# Patient Record
Sex: FEMALE | Race: WHITE | NOT HISPANIC OR LATINO | Employment: STUDENT | URBAN - METROPOLITAN AREA
[De-identification: names, ages, dates, MRNs, and addresses within clinical notes are randomized per-mention and may not be internally consistent; named-entity substitution may affect disease eponyms.]

---
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
## 2025-01-13 ENCOUNTER — APPOINTMENT (EMERGENCY)
Dept: CT IMAGING | Facility: HOSPITAL | Age: 20
End: 2025-01-13
Payer: COMMERCIAL

## 2025-01-13 ENCOUNTER — HOSPITAL ENCOUNTER (EMERGENCY)
Facility: HOSPITAL | Age: 20
Discharge: HOME | End: 2025-01-14
Attending: EMERGENCY MEDICINE | Admitting: EMERGENCY MEDICINE
Payer: COMMERCIAL

## 2025-01-13 DIAGNOSIS — R53.83 FATIGUE: ICD-10-CM

## 2025-01-13 DIAGNOSIS — R51.9 HEADACHE: Primary | ICD-10-CM

## 2025-01-13 DIAGNOSIS — R04.0 EPISTAXIS: ICD-10-CM

## 2025-01-13 DIAGNOSIS — I10 HYPERTENSION: ICD-10-CM

## 2025-01-13 LAB
ALBUMIN SERPL BCG-MCNC: 3.8 G/DL (ref 3.5–5)
ALP SERPL-CCNC: 82 U/L (ref 34–104)
ALT SERPL W P-5'-P-CCNC: 17 U/L (ref 7–52)
ANION GAP SERPL CALCULATED.3IONS-SCNC: 8 MMOL/L (ref 4–13)
AST SERPL W P-5'-P-CCNC: 15 U/L (ref 13–39)
BASOPHILS # BLD AUTO: 0.05 THOUSANDS/ΜL (ref 0–0.1)
BASOPHILS NFR BLD AUTO: 1 % (ref 0–1)
BILIRUB SERPL-MCNC: 0.26 MG/DL (ref 0.2–1)
BUN SERPL-MCNC: 12 MG/DL (ref 5–25)
CALCIUM SERPL-MCNC: 9 MG/DL (ref 8.4–10.2)
CARDIAC TROPONIN I PNL SERPL HS: <2 NG/L (ref ?–50)
CHLORIDE SERPL-SCNC: 104 MMOL/L (ref 96–108)
CO2 SERPL-SCNC: 25 MMOL/L (ref 21–32)
CREAT SERPL-MCNC: 0.64 MG/DL (ref 0.6–1.3)
EOSINOPHIL # BLD AUTO: 0.18 THOUSAND/ΜL (ref 0–0.61)
EOSINOPHIL NFR BLD AUTO: 2 % (ref 0–6)
ERYTHROCYTE [DISTWIDTH] IN BLOOD BY AUTOMATED COUNT: 13.5 % (ref 11.6–15.1)
GFR SERPL CREATININE-BSD FRML MDRD: 129 ML/MIN/1.73SQ M
GLUCOSE SERPL-MCNC: 81 MG/DL (ref 65–140)
HCG SERPL QL: NEGATIVE
HCT VFR BLD AUTO: 42.3 % (ref 34.8–46.1)
HGB BLD-MCNC: 13 G/DL (ref 11.5–15.4)
IMM GRANULOCYTES # BLD AUTO: 0.03 THOUSAND/UL (ref 0–0.2)
IMM GRANULOCYTES NFR BLD AUTO: 0 % (ref 0–2)
LYMPHOCYTES # BLD AUTO: 3.34 THOUSANDS/ΜL (ref 0.6–4.47)
LYMPHOCYTES NFR BLD AUTO: 38 % (ref 14–44)
MCH RBC QN AUTO: 24.9 PG (ref 26.8–34.3)
MCHC RBC AUTO-ENTMCNC: 30.7 G/DL (ref 31.4–37.4)
MCV RBC AUTO: 81 FL (ref 82–98)
MONOCYTES # BLD AUTO: 0.62 THOUSAND/ΜL (ref 0.17–1.22)
MONOCYTES NFR BLD AUTO: 7 % (ref 4–12)
NEUTROPHILS # BLD AUTO: 4.59 THOUSANDS/ΜL (ref 1.85–7.62)
NEUTS SEG NFR BLD AUTO: 52 % (ref 43–75)
NRBC BLD AUTO-RTO: 0 /100 WBCS
PLATELET # BLD AUTO: 310 THOUSANDS/UL (ref 149–390)
PMV BLD AUTO: 8.9 FL (ref 8.9–12.7)
POTASSIUM SERPL-SCNC: 3.9 MMOL/L (ref 3.5–5.3)
PROT SERPL-MCNC: 7.2 G/DL (ref 6.4–8.4)
RBC # BLD AUTO: 5.22 MILLION/UL (ref 3.81–5.12)
SODIUM SERPL-SCNC: 137 MMOL/L (ref 135–147)
TSH SERPL DL<=0.05 MIU/L-ACNC: 4.15 UIU/ML (ref 0.45–4.5)
WBC # BLD AUTO: 8.81 THOUSAND/UL (ref 4.31–10.16)

## 2025-01-13 PROCEDURE — 70498 CT ANGIOGRAPHY NECK: CPT

## 2025-01-13 PROCEDURE — 93005 ELECTROCARDIOGRAM TRACING: CPT

## 2025-01-13 PROCEDURE — 96361 HYDRATE IV INFUSION ADD-ON: CPT

## 2025-01-13 PROCEDURE — 96375 TX/PRO/DX INJ NEW DRUG ADDON: CPT

## 2025-01-13 PROCEDURE — 70496 CT ANGIOGRAPHY HEAD: CPT

## 2025-01-13 PROCEDURE — 84443 ASSAY THYROID STIM HORMONE: CPT | Performed by: EMERGENCY MEDICINE

## 2025-01-13 PROCEDURE — 80053 COMPREHEN METABOLIC PANEL: CPT | Performed by: EMERGENCY MEDICINE

## 2025-01-13 PROCEDURE — 99285 EMERGENCY DEPT VISIT HI MDM: CPT

## 2025-01-13 PROCEDURE — 36415 COLL VENOUS BLD VENIPUNCTURE: CPT | Performed by: EMERGENCY MEDICINE

## 2025-01-13 PROCEDURE — 99284 EMERGENCY DEPT VISIT MOD MDM: CPT | Performed by: EMERGENCY MEDICINE

## 2025-01-13 PROCEDURE — 85025 COMPLETE CBC W/AUTO DIFF WBC: CPT | Performed by: EMERGENCY MEDICINE

## 2025-01-13 PROCEDURE — 84484 ASSAY OF TROPONIN QUANT: CPT | Performed by: EMERGENCY MEDICINE

## 2025-01-13 PROCEDURE — 84703 CHORIONIC GONADOTROPIN ASSAY: CPT | Performed by: EMERGENCY MEDICINE

## 2025-01-13 PROCEDURE — 96365 THER/PROPH/DIAG IV INF INIT: CPT

## 2025-01-13 RX ORDER — MAGNESIUM SULFATE 1 G/100ML
1 INJECTION INTRAVENOUS ONCE
Status: COMPLETED | OUTPATIENT
Start: 2025-01-13 | End: 2025-01-14

## 2025-01-13 RX ORDER — METOCLOPRAMIDE HYDROCHLORIDE 5 MG/ML
10 INJECTION INTRAMUSCULAR; INTRAVENOUS ONCE
Status: COMPLETED | OUTPATIENT
Start: 2025-01-13 | End: 2025-01-13

## 2025-01-13 RX ORDER — DIPHENHYDRAMINE HYDROCHLORIDE 50 MG/ML
25 INJECTION INTRAMUSCULAR; INTRAVENOUS ONCE
Status: COMPLETED | OUTPATIENT
Start: 2025-01-13 | End: 2025-01-13

## 2025-01-13 RX ADMIN — DIPHENHYDRAMINE HYDROCHLORIDE 25 MG: 50 INJECTION, SOLUTION INTRAMUSCULAR; INTRAVENOUS at 22:16

## 2025-01-13 RX ADMIN — IOHEXOL 85 ML: 350 INJECTION, SOLUTION INTRAVENOUS at 22:58

## 2025-01-13 RX ADMIN — MAGNESIUM SULFATE HEPTAHYDRATE 1 G: 1 INJECTION, SOLUTION INTRAVENOUS at 23:29

## 2025-01-13 RX ADMIN — METOCLOPRAMIDE 10 MG: 5 INJECTION, SOLUTION INTRAMUSCULAR; INTRAVENOUS at 22:16

## 2025-01-13 RX ADMIN — SODIUM CHLORIDE 1000 ML: 0.9 INJECTION, SOLUTION INTRAVENOUS at 22:16

## 2025-01-13 NOTE — Clinical Note
Peace Conway accompanied Astrid Sanabria to the emergency department on 1/13/2025.    Return date if applicable: 01/14/2025        If you have any questions or concerns, please don't hesitate to call.      Daiana Wheeler RN

## 2025-01-14 VITALS
TEMPERATURE: 98.1 F | OXYGEN SATURATION: 99 % | HEART RATE: 89 BPM | DIASTOLIC BLOOD PRESSURE: 57 MMHG | SYSTOLIC BLOOD PRESSURE: 127 MMHG | RESPIRATION RATE: 18 BRPM | WEIGHT: 293 LBS | BODY MASS INDEX: 54.77 KG/M2

## 2025-01-14 LAB
ATRIAL RATE: 117 BPM
P AXIS: 55 DEGREES
PR INTERVAL: 154 MS
QRS AXIS: 1 DEGREES
QRSD INTERVAL: 86 MS
QT INTERVAL: 322 MS
QTC INTERVAL: 449 MS
T WAVE AXIS: 38 DEGREES
VENTRICULAR RATE: 117 BPM

## 2025-01-14 PROCEDURE — 93010 ELECTROCARDIOGRAM REPORT: CPT | Performed by: INTERNAL MEDICINE

## 2025-01-14 NOTE — ED PROVIDER NOTES
"Time reflects when diagnosis was documented in both MDM as applicable and the Disposition within this note       Time User Action Codes Description Comment    1/14/2025 12:29 AM HerreraVijay [R51.9] Headache     1/14/2025 12:29 AM Vijay Silverman [R04.0] Epistaxis     1/14/2025 12:29 AM Vijay Silverman [R53.83] Fatigue     1/14/2025 12:31 AM Vijay Silverman [I10] Hypertension           ED Disposition       ED Disposition   Discharge    Condition   Stable    Date/Time   Tue Jan 14, 2025 12:30 AM    Comment   Astrid Sanabria discharge to home/self care.                   Assessment & Plan       Medical Decision Making  19 y.o. female presents with multiple complaints stating \"a lot of things.\"    Patient states over the last 5 days, she has had hypertension and epistaxis though she does not have any epistaxis at present.  She notes intermittent episodes.    Patient denies any chest pain.  Patient denies any dyspnea.    Patient affirms 5 days of bioccipital headaches that have been migratory and intermittent though currently present for the past few hours..  Patient describes \"pressure\" that \"comes and goes but they mostly stay.\"  Note that the triage note states the patient has blurry vision but she states she has \"light sensitivity\" and denies any visual disturbances.  Patient has spectacles and feels her vision is at baseline.    Patient also notes fatigue during this period.  Patient also describes dizziness that she states is \"I just feel woozy, I just feel out of it.\"  Patient states \"sometimes I feel like I'm going to pass out and sometimes I feel really tired.\"  Patient denies any ambulatory difficulty.    Patient also states that she feels as though she has been \"sweating a lot.\"  Patient denies night exclusively.    Patient denies a history of similar headaches.      Patient denies any concerns for CO exposure.  Patient denies any recent tick bites.  Patient denies any recent cervical " manipulation  Patient denies any recent trauma.    Patient denies any history of connective tissue disorders.    Patient denies any history or family history of SAH.    Patient denies any history of immunocompromised state.    Patient denies any use of illicit drugs.  Patient affirms use of estrogen containing products.    Patient affirms nausea but denies vomiting.    Patient denies any fever or chills.  Patient is afebrile in the emergency room.  Patient denies any visual changes other than the light sensitivity.  Patient denies any sensory changes.    Patient denies any focal weakness.      ROS: patient denies seizure, weight loss, confusion, diaphoresis, neck pain/stiffness, facial pain, speech difficulty, gait disturbance, vertigo, lightheadedness, jaw claudication, syncope.  All other review of systems reviewed and noted to be negative.    Focused Objective:  Eyes:  PERRL, EOMI.  Grossly normal fundoscopic exam without signs of papilledema or retinal hemorrhage.  No nystagmus with gaze fixation.  HENT: Atraumatic.  Pharynx moist and non-erythematous.  No tenderness or swelling over the temporal arteries.  Neck: no carotid bruit, no tenderness to palpitation.  Able to touch chin to chest.  Negative jolt accentuation testing.  Neuro:  Alert and answers questions appropriately.  No dysarthria.  Normal tandem gait, including toe walking and heel walking.  Normal Romberg exam.  No pronator drift.  Normal finger to nose.  Normal fine motor function with rapid finger movements.  Normal hand tap.  Cranial nerves II through XII grossly intact.  Visual fields grossly intact. Upper and lower motor strength 5/5 and symmetric.  Normal light touch sensory exam.   Normal DTRs.     Medical Decision Making  Patient presenting with multiple symptoms representing a broad differential.     Patient affirms hypertension and is hypertensive upon arrival.  Patient has established primary care for which I have discussed the need for  follow-up to reevaluate this.  Patient denies any chest pain though she did note palpitations to nursing, she only describes the possible lightheadedness to myself however considering this history will obtain cardiac evaluation and place patient on cardiac monitoring while further evaluating patient's complaints.  I have emphasized the need for follow-up with primary care to reassess hypertension.    Regarding patient's epistaxis, none at present so no indications for intervention.  Discussed symptomatic management if this were to recur and follow-up with ENT for evaluation if symptoms continue but do not worsen.    Regarding patient's headache, unclear vague symptoms but fortunately with intact neurologic exam.  Patient denies any fever and is afebrile upon arrival.  Patient does not have any meningeal signs that would be concerning for infectious etiology.  Considering her history including use of OTCs will obtain CTA imaging to evaluate for potential intracranial etiology including central venous thrombosis or hemorrhage.  Patient's timing is well outside the window for any immediate intervention and she is atypical history without deficits that would be concerning for CVA.  Patient is at risk for intracranial idiopathic hypertension but no clear findings on funduscopic exam on my evaluation.   Will treat patient symptomatically while completing this and reassess the need for additional testing.      Burning patient's fatigue and other symptoms that are quite vague in nature, no clear etiology could be consistent in identifying all of her symptoms.  Will send TSH though I discussed this will be unavailable if initial testing is abnormal.  Obtain electrolytes and CBC to evaluate for anemia.    Will monitor patient, reassess, and reevaluate.    Amount and/or Complexity of Data Reviewed  Labs: ordered.  Radiology: ordered.    Risk  Prescription drug management.        ED Course as of 01/14/25 0405   Tue Jan 14, 2025  "  0033 Patient states her symptoms have resolved following treatment.  Patient states \"I feel good.\"  Patient's laboratory evaluation reassuring and CT imaging without acute findings.  I discussed diagnostic concern regarding etiology and continued evaluation.  Discussed risk and benefits of lumbar puncture and alternative workup and patient prefers follow-up with ophthalmology and declines lumbar puncture in the emergency room.  Discussed alternatively could follow-up with primary care to obtain MRI though currently she is asymptomatic.  Discussed and emphasized the need for this follow-up in detail.  Discussed return to the emergency room any progression or worsening of symptoms.           Medications   metoclopramide (REGLAN) injection 10 mg (10 mg Intravenous Given 1/13/25 2216)   magnesium sulfate IVPB (premix) SOLN 1 g (0 g Intravenous Stopped 1/14/25 0022)   diphenhydrAMINE (BENADRYL) injection 25 mg (25 mg Intravenous Given 1/13/25 2216)   sodium chloride 0.9 % bolus 1,000 mL (0 mL Intravenous Stopped 1/13/25 2300)   iohexol (OMNIPAQUE) 350 MG/ML injection (MULTI-DOSE) 85 mL (85 mL Intravenous Given 1/13/25 2258)       ED Risk Strat Scores            CRAFFT      Flowsheet Row Most Recent Value   CRAFFT Initial Screen: During the past 12 months, did you:    1. Drink any alcohol (more than a few sips)?  No Filed at: 01/14/2025 0118   2. Smoke any marijuana or hashish No Filed at: 01/14/2025 0118   3. Use anything else to get high? (\"anything else\" includes illegal drugs, over the counter and prescription drugs, and things that you sniff or 'santana')? No Filed at: 01/14/2025 0118                                          History of Present Illness       Chief Complaint   Patient presents with    Headache     Pt arrives w/ multiple complaints frequent HA x 5days. Pt reports associated vision blurriness and palpitations. Pt directed to be seen in ED by  provider       Past Medical History:   Diagnosis Date    ADD " (attention deficit disorder)     Anxiety     Depression     Obesity       History reviewed. No pertinent surgical history.   Family History   Problem Relation Age of Onset    No Known Problems Mother     Hypertension Father     Hyperlipidemia Father     Hypertension Maternal Grandmother     Hyperlipidemia Maternal Grandmother     Multiple sclerosis Maternal Grandmother       Social History     Tobacco Use    Smoking status: Former     Types: Cigarettes     Start date: 10/18/2023     Quit date: 10/18/2022     Years since quittin.2     Passive exposure: Current    Smokeless tobacco: Never   Vaping Use    Vaping status: Every Day    Substances: Flavoring   Substance Use Topics    Alcohol use: Never    Drug use: Not Currently     Types: Marijuana     Comment: quit       E-Cigarette/Vaping    E-Cigarette Use Current Every Day User     Comments vaper  NO NICOTINE       E-Cigarette/Vaping Substances    Nicotine No     THC No     CBD No     Flavoring Yes     Other No     Unknown No       I have reviewed and agree with the history as documented.     HPI    Review of Systems        Objective       ED Triage Vitals   Temperature Pulse Blood Pressure Respirations SpO2 Patient Position - Orthostatic VS   25 --   (!) 97.4 °F (36.3 °C) (!) 127 (!) 185/127 21 99 %       Temp Source Heart Rate Source BP Location FiO2 (%) Pain Score    252 25 -- 25    Oral Monitor Left arm  6      Vitals      Date and Time Temp Pulse SpO2 Resp BP Pain Score FACES Pain Rating User   25 98.1 °F (36.7 °C) 89 99 % 18 127/57 -- -- ML   25 0003 -- 115 99 % -- 137/68 -- -- KM   25 -- 111 99 % -- 132/74 -- -- KM   25 -- -- -- -- -- 6 -- AIDAN   25 97.4 °F (36.3 °C) 127 99 % 21 185/127 -- --             Physical Exam  Constitutional:       Appearance: Normal appearance. She is obese.   Eyes:       General: No visual field deficit.  Cardiovascular:      Rate and Rhythm: Regular rhythm. Tachycardia present.   Pulmonary:      Effort: Pulmonary effort is normal.      Breath sounds: Normal breath sounds.   Abdominal:      Palpations: Abdomen is soft.      Tenderness: There is no abdominal tenderness. There is no guarding or rebound.   Neurological:      General: No focal deficit present.      Mental Status: She is alert and oriented to person, place, and time.      Cranial Nerves: No cranial nerve deficit, dysarthria or facial asymmetry.      Sensory: Sensation is intact. No sensory deficit.      Motor: Motor function is intact. No weakness.      Coordination: Coordination is intact. Romberg sign negative. Coordination normal. Finger-Nose-Finger Test normal.      Gait: Gait and tandem walk normal.      Deep Tendon Reflexes: Reflexes normal.      Comments: Identifies common objects appropriately.  Normal hand tap.         Results Reviewed       Procedure Component Value Units Date/Time    TSH, 3rd generation with Free T4 reflex [[id number]]  (Normal) Collected: 01/13/25 2214    Lab Status: Final result Specimen: Blood from Arm, Right Updated: 01/13/25 2254     TSH 3RD GENERATON 4.153 uIU/mL     hCG, qualitative pregnancy [[id number]]  (Normal) Collected: 01/13/25 2214    Lab Status: Final result Specimen: Blood from Arm, Right Updated: 01/13/25 2254     Preg, Serum Negative    CBC and differential [[id number]]  (Abnormal) Collected: 01/13/25 2214    Lab Status: Final result Specimen: Blood from Arm, Right Updated: 01/13/25 2246     WBC 8.81 Thousand/uL      RBC 5.22 Million/uL      Hemoglobin 13.0 g/dL      Hematocrit 42.3 %      MCV 81 fL      MCH 24.9 pg      MCHC 30.7 g/dL      RDW 13.5 %      MPV 8.9 fL      Platelets 310 Thousands/uL      nRBC 0 /100 WBCs      Segmented % 52 %      Immature Grans % 0 %      Lymphocytes % 38 %      Monocytes % 7 %      Eosinophils Relative 2 %      Basophils Relative 1 %       Absolute Neutrophils 4.59 Thousands/µL      Absolute Immature Grans 0.03 Thousand/uL      Absolute Lymphocytes 3.34 Thousands/µL      Absolute Monocytes 0.62 Thousand/µL      Eosinophils Absolute 0.18 Thousand/µL      Basophils Absolute 0.05 Thousands/µL     HS Troponin 0hr (reflex protocol) [[id number]]  (Normal) Collected: 01/13/25 2214    Lab Status: Final result Specimen: Blood from Arm, Right Updated: 01/13/25 2245     hs TnI 0hr <2 ng/L     Comprehensive metabolic panel [[id number]] Collected: 01/13/25 2214    Lab Status: Final result Specimen: Blood from Arm, Right Updated: 01/13/25 2244     Sodium 137 mmol/L      Potassium 3.9 mmol/L      Chloride 104 mmol/L      CO2 25 mmol/L      ANION GAP 8 mmol/L      BUN 12 mg/dL      Creatinine 0.64 mg/dL      Glucose 81 mg/dL      Calcium 9.0 mg/dL      AST 15 U/L      ALT 17 U/L      Alkaline Phosphatase 82 U/L      Total Protein 7.2 g/dL      Albumin 3.8 g/dL      Total Bilirubin 0.26 mg/dL      eGFR 129 ml/min/1.73sq m     Narrative:      National Kidney Disease Foundation guidelines for Chronic Kidney Disease (CKD):     Stage 1 with normal or high GFR (GFR > 90 mL/min/1.73 square meters)    Stage 2 Mild CKD (GFR = 60-89 mL/min/1.73 square meters)    Stage 3A Moderate CKD (GFR = 45-59 mL/min/1.73 square meters)    Stage 3B Moderate CKD (GFR = 30-44 mL/min/1.73 square meters)    Stage 4 Severe CKD (GFR = 15-29 mL/min/1.73 square meters)    Stage 5 End Stage CKD (GFR <15 mL/min/1.73 square meters)  Note: GFR calculation is accurate only with a steady state creatinine            CTA head and neck with and without contrast   Final Interpretation by Apolinar Turcios MD (01/13 6804)         1. No intracranial hemorrhage, mass or mass effect.   2. No intracranial aneurysm or vascular malformation. No stenosis, dissection or occlusion of the carotid or vertebral arteries or major vessels of the [place].                  Workstation performed: [id number]              Procedures    ED Medication and Procedure Management   Prior to Admission Medications   Prescriptions Last Dose Informant Patient Reported? Taking?   ARIPiprazole (ABILIFY) 5 mg tablet  Self, Mother Yes No   Sig: 10 mg daily   Tri-Estarylla 0.18/0.215/0.25 MG-35 MCG per tablet  Self, Mother Yes No   buPROPion (WELLBUTRIN XL) 150 mg 24 hr tablet  Self, Mother Yes No   methylphenidate (CONCERTA) 27 MG ER tablet  Self, Mother Yes No   propranolol (INDERAL) 20 mg tablet  Self, Mother Yes No   Patient taking differently: Pt reported taking as needed for anxiety      Facility-Administered Medications: None     Discharge Medication List as of 1/14/2025 12:33 AM        CONTINUE these medications which have NOT CHANGED    Details   ARIPiprazole (ABILIFY) 5 mg tablet 10 mg daily, Starting Thu 9/1/2022, Historical Med      buPROPion (WELLBUTRIN XL) 150 mg 24 hr tablet Starting Wed 8/23/2023, Historical Med      methylphenidate (CONCERTA) 27 MG ER tablet Starting Thu 8/31/2023, Historical Med      propranolol (INDERAL) 20 mg tablet Starting Wed 8/23/2023, Historical Med      Tri-Estarylla 0.18/0.215/0.25 MG-35 MCG per tablet Starting Thu 7/7/2022, Historical Med           No discharge procedures on file.  ED SEPSIS DOCUMENTATION   Time reflects when diagnosis was documented in both MDM as applicable and the Disposition within this note       Time User Action Codes Description Comment    1/14/2025 12:29 AM Vijay Silverman [R51.9] Headache     1/14/2025 12:29 AM Vijay Silverman [R04.0] Epistaxis     1/14/2025 12:29 AM Vijay Silverman [R53.83] Fatigue     1/14/2025 12:31 AM Vijay Silverman [I10] Hypertension                  Vijay Silverman MD  01/14/25 8846

## 2025-01-16 NOTE — PROGRESS NOTES
6/6 weight check. Her mom just had skin removal surgery at [facility] in [place]. Weight up about 26 lbs from her surgeon consult.  Patient reports that she has been really stressed with school- taking a 7 class photography course that will be over next week. Feeling very overwhelmed with it and feels that her appetite has increased and snacking more. Encouraged small changes, don't go too restrictive as this can lead to overeating. 12 oz can diet coke and a gallon of water daily. Patient to have a discussion with her psychiatrist about her medications and if there will be adjustments post op- Abilify can make it difficult to lose weight, also Vyvanse may be a good option for her. Has been having 3 meals per day, but feels that she has been struggling with her portions.   Workflow reviewed:   Psych and/or D+A Clearance: recommended outpatient therapist- sees a counselor through [facility] 1x per week  PCP Letter: Discussed at recent visit on 8/9/24  Support Group: No longer required, strongly encouraged  Surgeon Appt: 8/6/24  EGD: completed 11/22  Cardiac Risk Assessment: scheduled 12/16/24- ordered stress test and echo- needs to reschedule  Sleep Studies: N/A (SB 4/8)  Blood work: Needs TSH and will likely need updated CBC and CMP  Nicotine test: Needs- vaping non-nicotine, will complete nicotine testing- patient aware she has to stop vaping even non-nicotine after surgery  Weight loss meds: N/A  Required weight checks: 6 months required  Weight Loss: Not required, encouraged positive lifestyle changes.  Alisa Casiano LCSW

## 2025-01-17 ENCOUNTER — CLINICAL SUPPORT (OUTPATIENT)
Dept: BARIATRICS | Facility: CLINIC | Age: 20
End: 2025-01-17

## 2025-01-17 VITALS — BODY MASS INDEX: 54.74 KG/M2 | WEIGHT: 293 LBS

## 2025-01-17 DIAGNOSIS — Z71.89 ENCOUNTER FOR PRE-BARIATRIC SURGERY COUNSELING AND EDUCATION: Primary | ICD-10-CM

## 2025-01-17 PROCEDURE — RECHECK

## 2025-02-14 ENCOUNTER — CLINICAL SUPPORT (OUTPATIENT)
Dept: BARIATRICS | Facility: CLINIC | Age: 20
End: 2025-02-14

## 2025-02-14 VITALS — WEIGHT: 293 LBS | BODY MASS INDEX: 56.14 KG/M2

## 2025-02-14 DIAGNOSIS — Z71.89 ENCOUNTER FOR PRE-BARIATRIC SURGERY COUNSELING AND EDUCATION: Primary | ICD-10-CM

## 2025-02-14 PROCEDURE — RECHECK

## 2025-02-14 NOTE — PROGRESS NOTES
Pre op. Has been working at a [employer] for the past 2 weeks in [place]. Working 6-330 M-F. Very active job. Has been enjoying it. Plans to complete her blood work including nicotine tomorrow- to try to also complete [facility] blood work that was ordered last march that includes a vitamin D. Has been eating smaller portions. Has been more mindful of making healthier choices. Patient still vaping non-nicotine, but has reduced. Does not bring it to work with her. Weight continues to increase since starting the process. Patient aware that she must work on getting closer to her evaluation weight prior to submitting. Drinking a ton of water since she started working- 32 oz x5. To schedule with MW to help with weight gain.  Workflow reviewed:   Psych and/or D+A Clearance: recommended outpatient therapist- sees a counselor through [facility] 1x per week  PCP Letter: Discussed at recent visit on 8/9/24  Support Group: No longer required, strongly encouraged  Surgeon Appt: 8/6/24  EGD: completed 11/22  Cardiac Risk Assessment: scheduled 12/16/24- ordered stress test and echo- needs to reschedule  Sleep Studies: N/A (SB 4/8)  Blood work: updated TSH and CMP and CBC completed 1/13 while in the hospital  Nicotine test: Needs- vaping non-nicotine, will complete nicotine testing- patient aware she has to stop vaping even non-nicotine after surgery  Weight loss meds: N/A  Required weight checks: 6 months required  Weight Loss: Not required, encouraged positive lifestyle changes.  Alisa Casiano LCSW

## 2025-03-04 ENCOUNTER — OFFICE VISIT (OUTPATIENT)
Dept: BARIATRICS | Facility: CLINIC | Age: 20
End: 2025-03-04
Payer: COMMERCIAL

## 2025-03-04 VITALS
SYSTOLIC BLOOD PRESSURE: 128 MMHG | DIASTOLIC BLOOD PRESSURE: 88 MMHG | BODY MASS INDEX: 44.41 KG/M2 | HEIGHT: 68 IN | WEIGHT: 293 LBS | OXYGEN SATURATION: 97 % | HEART RATE: 118 BPM

## 2025-03-04 DIAGNOSIS — E66.01 CLASS 3 SEVERE OBESITY DUE TO EXCESS CALORIES WITHOUT SERIOUS COMORBIDITY WITH BODY MASS INDEX (BMI) OF 50.0 TO 59.9 IN ADULT (HCC): Primary | ICD-10-CM

## 2025-03-04 DIAGNOSIS — E28.2 PCOS (POLYCYSTIC OVARIAN SYNDROME): ICD-10-CM

## 2025-03-04 DIAGNOSIS — E66.813 CLASS 3 SEVERE OBESITY DUE TO EXCESS CALORIES WITHOUT SERIOUS COMORBIDITY WITH BODY MASS INDEX (BMI) OF 50.0 TO 59.9 IN ADULT (HCC): Primary | ICD-10-CM

## 2025-03-04 PROCEDURE — 99214 OFFICE O/P EST MOD 30 MIN: CPT

## 2025-03-04 RX ORDER — TIRZEPATIDE 2.5 MG/.5ML
2.5 INJECTION, SOLUTION SUBCUTANEOUS WEEKLY
Qty: 2 ML | Refills: 0 | Status: SHIPPED | OUTPATIENT
Start: 2025-03-04 | End: 2025-04-01

## 2025-03-04 NOTE — ASSESSMENT & PLAN NOTE
- Patient is pursuing Ricardo-En-Y Gastric Bypass and follow up visits with medical weight management provider  - Initial weight loss goal of 5-10% weight loss for improved health. Weight loss can improve patient's co-morbid conditions and/or prevent weight-related complications.  - Explained the importance of continuing lifestyle changes in addition to any anti-obesity medications.   - Labs reviewed from 1/25    General Recommendations:  Nutrition:  Eat breakfast daily.  Do not skip meals.      Food log (ie.) www.myfitnesspal.com, sparkpeople.com, loseit.com, calorieking.com, etc.     Practice mindful eating.  Be sure to set aside time to eat, eat slowly, and savor your food.     Hydration:    At least 64oz of water daily.  No sugar sweetened beverages.  No juice (eat the fruit instead).     Exercise:  Studies have shown that the ideal exercise goal is somewhere between 150 to 300 minutes of moderate intensity exercise a week.  Start with exercising 10 minutes every other day and gradually increase physical activity with a goal of at least 150 minutes of moderate intensity exercise a week, divided over at least 3 days a week.  An example of this would be exercising 30 minutes a day, 5 days a week.  Resistance training can increase muscle mass and increase our resting metabolic rate.   FULL BODY resistance training is recommended 2-3 times a week.  Do not do this on consecutive days to allow for muscle recovery.     Aim for a bare minimum 5000 steps, even on days you do not exercise.     Monitoring:   Weigh yourself daily.  If this causes undue stress, then just weigh yourself once a week.  Weigh yourself the same time of the day with the same amount of clothing on.  Preferably this should be done after waking up, before you eat, and with no clothing or minimal clothing on.     Specific Goals:  Discussed her nutrition and lifestyle today and her barriers to weight loss and she has been doing great following her  recommendations from cassia the dietician and congratulated on her weight loss.   Encouraged to start tracking food.   Eat at least 3 meals per day with a focus on protein. Do not skip meals. Protein rich snacks discussed and protein shake options discussed.  Exercise 1-2 days per week to start for 10-15 minutes per day and increase from there while attempting to add some weight training.   Hydrate with 64 oz of water daily.   Follow the dietary recommendations discussed at her surgical dietician appts.   Discussed medications: She has tried phentermine and topamax in the past and did not tolerate. She is on wellbutrin with concerta already. She is interested in trying for a GLP 1. Patient denies personal and family history of  pancreatitis, thyroid cancer, MEN-2 tumors.  Denies any hx of glaucoma, seizures, kidney stones, gallstones, or gastroparesis.   Denies Hx of CAD, PAD, palpitations, arrhythmia.   Denies insomnia or sleep disturbance.   Zepbound Instructions reviewed:    - Begin Zepbound 2.5 mg subcutaneously once a week. Dose changes may occur after 4 doses if medication is tolerated. You will be assessed prior to each dose change to make sure you are tolerating the medication well.  - Please message me when you have 2 pens left from the prescription so there are no lapses in treatment.  - If you have been off the medication for more than 14 days please contact the office as you will need to restart the titration at the starting dose again to avoid significant side effects or adverse events.  - Visit Zepbound.com for further information/injection instructions.   -Please eat small frequent meals to help reduce nausea. Lemon water and saltine crackers may help with this.   - Side effects of Zepbound discussed: nausea, vomiting, diarrhea, and constipation. If you experience fever, nausea/vomiting, and pain radiating to your back this may be a sign of pancreatitis. Please go to the emergency room if this  occurs.  - If on oral birth control a 2nd method of birth control is recommended during the 1st 8 weeks of therapy and for 4 weeks after any dosage change.   - Patient understands the side effects of the medication and proper administration. Patient agrees with the treatment plan and all questions were answered.

## 2025-03-04 NOTE — PROGRESS NOTES
Assessment/Plan:     Class 3 severe obesity due to excess calories without serious comorbidity with body mass index (BMI) of 50.0 to 59.9 in adult (HCC)  - Patient is pursuing Ricardo-En-Y Gastric Bypass and follow up visits with medical weight management provider  - Initial weight loss goal of 5-10% weight loss for improved health. Weight loss can improve patient's co-morbid conditions and/or prevent weight-related complications.  - Explained the importance of continuing lifestyle changes in addition to any anti-obesity medications.   - Labs reviewed from 1/25    General Recommendations:  Nutrition:  Eat breakfast daily.  Do not skip meals.      Food log (ie.) www.Just [email or web address], sparkpeople.com, loseit.com, calorieking.com, etc.     Practice mindful eating.  Be sure to set aside time to eat, eat slowly, and savor your food.     Hydration:    At least 64oz of water daily.  No sugar sweetened beverages.  No juice (eat the fruit instead).     Exercise:  Studies have shown that the ideal exercise goal is somewhere between 150 to 300 minutes of moderate intensity exercise a week.  Start with exercising 10 minutes every other day and gradually increase physical activity with a goal of at least 150 minutes of moderate intensity exercise a week, divided over at least 3 days a week.  An example of this would be exercising 30 minutes a day, 5 days a week.  Resistance training can increase muscle mass and increase our resting metabolic rate.   FULL BODY resistance training is recommended 2-3 times a week.  Do not do this on consecutive days to allow for muscle recovery.     Aim for a bare minimum 5000 steps, even on days you do not exercise.     Monitoring:   Weigh yourself daily.  If this causes undue stress, then just weigh yourself once a week.  Weigh yourself the same time of the day with the same amount of clothing on.  Preferably this should be done after waking up, before you eat, and with no clothing or minimal  clothing on.     Specific Goals:  Discussed her nutrition and lifestyle today and her barriers to weight loss and she has been doing great following her recommendations from cassia quintero dietician and congratulated on her weight loss.   Encouraged to start tracking food.   Eat at least 3 meals per day with a focus on protein. Do not skip meals. Protein rich snacks discussed and protein shake options discussed.  Exercise 1-2 days per week to start for 10-15 minutes per day and increase from there while attempting to add some weight training.   Hydrate with 64 oz of water daily.   Follow the dietary recommendations discussed at her surgical dietician appts.   Discussed medications: She has tried phentermine and topamax in the past and did not tolerate. She is on wellbutrin with concerta already. She is interested in trying for a GLP 1. Patient denies personal and family history of  pancreatitis, thyroid cancer, MEN-2 tumors.  Denies any hx of glaucoma, seizures, kidney stones, gallstones, or gastroparesis.   Denies Hx of CAD, PAD, palpitations, arrhythmia.   Denies insomnia or sleep disturbance.   Zepbound Instructions reviewed:    - Begin Zepbound 2.5 mg subcutaneously once a week. Dose changes may occur after 4 doses if medication is tolerated. You will be assessed prior to each dose change to make sure you are tolerating the medication well.  - Please message me when you have 2 pens left from the prescription so there are no lapses in treatment.  - If you have been off the medication for more than 14 days please contact the office as you will need to restart the titration at the starting dose again to avoid significant side effects or adverse events.  - Visit Zepbound.com for further information/injection instructions.   -Please eat small frequent meals to help reduce nausea. Lemon water and saltine crackers may help with this.   - Side effects of Zepbound discussed: nausea, vomiting, diarrhea, and constipation. If  you experience fever, nausea/vomiting, and pain radiating to your back this may be a sign of pancreatitis. Please go to the emergency room if this occurs.  - If on oral birth control a 2nd method of birth control is recommended during the 1st 8 weeks of therapy and for 4 weeks after any dosage change.   - Patient understands the side effects of the medication and proper administration. Patient agrees with the treatment plan and all questions were answered.           Astrid was seen today for follow-up.    Diagnoses and all orders for this visit:    Class 3 severe obesity due to excess calories without serious comorbidity with body mass index (BMI) of 50.0 to 59.9 in adult (HCC)  -     tirzepatide (Zepbound) 2.5 mg/0.5 mL auto-injector; Inject 0.5 mL (2.5 mg total) under the skin once a week for 28 days    PCOS (polycystic ovarian syndrome)  -     tirzepatide (Zepbound) 2.5 mg/0.5 mL auto-injector; Inject 0.5 mL (2.5 mg total) under the skin once a week for 28 days        Total time spent reviewing chart, interviewing patient, examining patient, discussing plan, answering all questions, and documentin minutes with >50% face-to-face time with the patient.    Follow up in approximately 2 months with Non-Surgical Physician/Advanced Practitioner.    Subjective:   Chief Complaint   Patient presents with    Follow-up     Follow up with provider, Isabel.       Patient ID: Astrid Sanabria  is a 19 y.o. female with excess weight/obesity here to pursue weight management.  Patient is pursuing Ricardo-En-Y Gastric Bypass and Vertical Sleeve Gastrectomy.   Most recent notes and records were reviewed.    HPI    Wt Readings from Last 20 Encounters:   25 (!) 159 kg (350 lb) (>99%, Z= 3.08)*   25 (!) 167 kg (369 lb 3.2 oz) (>99%, Z= 3.14)*   25 (!) 163 kg (360 lb) (>99%, Z= 3.10)*   25 (!) 163 kg (360 lb 3.7 oz) (>99%, Z= 3.10)*   24 (!) 158 kg (349 lb 3.2 oz) (>99%, Z= 3.05)*   24 (!) 157 kg (346  lb) (>99%, Z= 3.04)*   11/27/24 (!) 156 kg (343 lb) (>99%, Z= 3.02)*   11/22/24 (!) 156 kg (343 lb 14.7 oz) (>99%, Z= 3.02)*   11/12/24 (!) 153 kg (337 lb) (>99%, Z= 3.00)*   10/30/24 (!) 153 kg (337 lb) (>99%, Z= 2.99)*   09/30/24 (!) 154 kg (340 lb) (>99%, Z= 2.99)*   08/27/24 (!) 153 kg (337 lb) (>99%, Z= 2.97)*   08/06/24 (!) 151 kg (333 lb 9.6 oz) (>99%, Z= 2.95)*   06/10/24 (!) 149 kg (328 lb) (>99%, Z= 2.91)*   05/01/24 (!) 149 kg (328 lb 3.2 oz) (>99%, Z= 2.89)*   04/11/24 (!) 147 kg (323 lb) (>99%, Z= 2.87)*   03/20/24 (!) 146 kg (322 lb 9.6 oz) (>99%, Z= 2.86)*   02/10/24 (!) 143 kg (315 lb 7.7 oz) (>99%, Z= 2.82)*   06/24/23 120 kg (264 lb 15.9 oz) (>99%, Z= 2.55)*   09/24/22 103 kg (226 lb) (99%, Z= 2.27)*     * Growth percentiles are based on CDC (Girls, 2-20 Years) data.       Patient presents today to medical weight management office for follow up. She is on the surgery track and is re consulting with SEVERINO for additional help with weight loss as she prepares for surgery she is also considering not going through with the surgery since she has done well to lose weight on her own so far.   She has a new job in a warehouse and has been implementing cynthias recommendations and she is losing weight because she is sweating at work regularly.      ADHD - managed on Concerta  Depression 0 managed on wellbutrin 150mg daily  DMDD - managed on abilify     Lifetime issue with weight.  In a new relationship and has started gaining weight since. Gained approximately 30lbs in 6 months     Previously saw Dr. Krueegr. Was started on Phentermine and Topamax. Lost some weight. Discontinued medication and then regained. When she restarted the phentermine she had increased anxiety and anger with the phentermine. She finished it for about 1 month ago and emotions have since regulated.      Previously tried weight watchers and other diet plans. [place] that she did a decent job with weight loss.     Weight loss medication and  dose: Wellbutrin  Started weight and date: 322 lbs 3/2024/  lbs 2/14/2025  Current weight: 350 lbs   Difference: -19 lbs     Starting BMI: 49.9  Current BMI: 53              Food Intake and Lifestyle Assessment   Food Intake Assessment completed via usual diet recall  Breakfast: Usually skips 7:00am -12:00p  Drinks lots of water   Lunch: Hot bar at work or chix tenders from [employer]  Dinner: Mom cooks - keto meal - usually just protein and veg  Snack: when bored - candy or chips  Beverage intake: water,  Cirkul - sugar free beverages, and diet soda - occ  Portions:  9 oz Protein  0 c Starch   1.5  c Vegetable    Protein supplement: Used to drink 2023 when going to gym  (Ensure Max and Protein Popcorn)  Estimated protein intake per day:  grams  Estimated fluid intake per day: At least 100 oz   Meals eaten away from home: Most days for lunch - rarely out now as saving money but was 1-2X week   Typical meal pattern: 2 meals per day and 1-2 snacks per day  Eating Behaviors: Consumption of high calorie/ high fat foods, Large portion sizes, Mindless eating, Emotional eating, Craves sweet foods, and Craves salty foods (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    Family History   Problem Relation Age of Onset    No Known Problems Mother     Hypertension Father     Hyperlipidemia Father     Hypertension Maternal Grandmother     Hyperlipidemia Maternal Grandmother     Multiple sclerosis Maternal Grandmother         Review of Systems   Constitutional:  Negative for fatigue.   HENT:  Negative for sore throat.    Respiratory:  Negative for cough and shortness of breath.    Cardiovascular:  Negative for chest pain, palpitations and leg  "swelling.   Gastrointestinal:  Negative for abdominal pain, constipation, diarrhea, nausea and vomiting.   Genitourinary:  Negative for dysuria.   Musculoskeletal:  Negative for arthralgias and back pain.   Skin:  Negative for rash.   Neurological:  Negative for headaches.   Psychiatric/Behavioral:  Negative for dysphoric mood. The patient is not nervous/anxious.        Objective:  /88 (BP Location: Left arm, Patient Position: Sitting, Cuff Size: Large)   Pulse (!) 118   Ht 5' 8\" (1.727 m)   Wt (!) 159 kg (350 lb)   LMP 02/03/2025 (Approximate)   SpO2 97%   BMI 53.22 kg/m²     Physical Exam  Vitals and nursing note reviewed.   Constitutional:       Appearance: Normal appearance. She is obese.   HENT:      Head: Normocephalic.   Pulmonary:      Effort: Pulmonary effort is normal.   Neurological:      Mental Status: She is oriented to person, place, and time.   Psychiatric:         Mood and Affect: Mood normal.         Behavior: Behavior normal.         Thought Content: Thought content normal.         Judgment: Judgment normal.            Labs   Most recent labs reviewed   Lab Results   Component Value Date    SODIUM 137 01/13/2025    K 3.9 01/13/2025     01/13/2025    CO2 25 01/13/2025    AGAP 8 01/13/2025    BUN 12 01/13/2025    CREATININE 0.64 01/13/2025    GLUC 81 01/13/2025    CALCIUM 9.0 01/13/2025    AST 15 01/13/2025    ALT 17 01/13/2025    ALKPHOS 82 01/13/2025    TP 7.2 01/13/2025    TBILI 0.26 01/13/2025    EGFR 129 01/13/2025     Lab Results   Component Value Date    HGBA1C 5.6 08/14/2024     Lab Results   Component Value Date    NAZ9WVYAONYQ 4.153 01/13/2025    TSH 2.15 04/04/2022     No results found for: \"CHOLESTEROL\"  No results found for: \"HDL\"  No results found for: \"TRIG\"  No results found for: \"LDLCALC\"  "

## 2025-03-05 ENCOUNTER — TELEPHONE (OUTPATIENT)
Dept: BARIATRICS | Facility: CLINIC | Age: 20
End: 2025-03-05

## 2025-03-05 NOTE — TELEPHONE ENCOUNTER
Neeta from [facility] called to say the Zepbound is covered and she will be faxing over the approval

## 2025-04-10 ENCOUNTER — CLINICAL SUPPORT (OUTPATIENT)
Dept: BARIATRICS | Facility: CLINIC | Age: 20
End: 2025-04-10

## 2025-04-10 VITALS — WEIGHT: 293 LBS | BODY MASS INDEX: 50.02 KG/M2

## 2025-04-10 DIAGNOSIS — E66.01 MORBID (SEVERE) OBESITY DUE TO EXCESS CALORIES (HCC): Primary | ICD-10-CM

## 2025-04-10 PROCEDURE — RECHECK

## 2025-04-10 NOTE — PROGRESS NOTES
"Bariatric Nutrition Assessment - PreOp  Note    Insurance: 6 required monthly weight checks     Type of surgery    Vertical sleeve gastrectomy  Surgery Date: TBD  Consult with Dr. Abbie nam 8/6/2024      Nutrition Assessment   Astrid Sanabria  19 y.o.  female       Wt (!) 149 kg (329 lb)   BMI 50.02 kg/m²   Current weight: 329#  Initial Weight at Surgeon Consult: 333.6#   BMI: 50.7  Height: 5'8\"  Eval Weight: 337#   BMI: 51.2  Wt with BMI of 25: 169.5#  Pre-Op Excess Wt: 169.1#  BMI to Qualify at 40 = 263#  BMI to Qualify at 35 = 230#  PMH includes:  Obesity, PCOS, Acanthosis nigricans, Anxiety and depression, Mixed hyperlipidemia    Pt advised not to gain weight during preop process. Pt encouraged to lose weight via healthy eating and exercise. Pt may follow Liver Shrinking diet 2 weeks or more  prior to DOS depending on BMI at time. This diet will promote weight loss.    [place]- St. Jeor Equation:    UTO=8221  Weight Maintenance 2800  Estimated calories for weight loss 1303-1038 ( 1-2# per wk wt loss - sedentary )  Estimated protein needs  grams (1.0-1.5 gms/kg IBW )   Estimated fluid needs 75-88 oz (30-35 ml/kg IBW )      NAFLD Fibrosis Score: -1.46 at 1/13/2025 10:14 PM  Calculated from:  SGOT/AST: 15 U/L at 1/13/2025 10:14 PM  SGPT/ALT: 17 U/L at 1/13/2025 10:14 PM  Serum Albumin: 3.8 g/dL at 1/13/2025 10:14 PM  Platelets: 310 Thousands/uL at 1/13/2025 10:14 PM  Age: 19 years  BMI: 54.8 at 1/13/2025  8:35 PM  Weight (recorded): 163.40 kg at 1/13/2025  8:35 PM  Height: 172.70 cm at 12/16/2024  9:48 AM  Has Diabetes: No    Weight History  Reason for WLS:  Improve health - tried other ways to lose weight -  Onset of Obesity: Childhood  Family history of obesity: Yes  Wt Loss Attempts: Commercial Programs (Weight Watchers, Angela Sukumar, etc.)  Counseling with  MD  Exercise  FAD Diets (Cabbage soup, Grapefruit, Cleanse, etc.)  High Protein/Low CHO diets (Atkins, [place], etc.)  OTC meds/supplements  Self " Created Diets (Portion Control, Healthy Food Choices, etc.)  Patient has tried the above for 6 months or more with insufficient weight loss or weight regain, which is why patient has requested to be evaluated for weight loss surgery today  Maximum Wt Lost: 50-60 while on weight loss pills prescribed       Review of History and Medications   OTC: None  Past Medical History:   Diagnosis Date    ADD (attention deficit disorder)     Anxiety     Depression     Obesity      No past surgical history on file.  Social History     Socioeconomic History    Marital status: Single     Spouse name: Not on file    Number of children: Not on file    Years of education: Not on file    Highest education level: Not on file   Occupational History    Not on file   Tobacco Use    Smoking status: Former     Types: Cigarettes     Start date: 10/18/2023     Quit date: 10/18/2022     Years since quittin.4     Passive exposure: Current    Smokeless tobacco: Never   Vaping Use    Vaping status: Every Day    Substances: Flavoring   Substance and Sexual Activity    Alcohol use: Never    Drug use: Not Currently     Types: Marijuana     Comment: quit     Sexual activity: Yes     Partners: Male   Other Topics Concern    Not on file   Social History Narrative    Not on file     Social Drivers of Health     Financial Resource Strain: Not on file   Food Insecurity: Not on file   Transportation Needs: Not on file   Physical Activity: Not on file   Stress: Not on file   Social Connections: Unknown (2024)    Received from [employer]     How often do you feel lonely or isolated from those around you? (Adult - for ages 18 years and over): Not on file   Intimate Partner Violence: Not on file   Housing Stability: Not on file       Current Outpatient Medications:     ARIPiprazole (ABILIFY) 5 mg tablet, 10 mg daily, Disp: , Rfl:     buPROPion (WELLBUTRIN XL) 150 mg 24 hr tablet, , Disp: , Rfl:     methylphenidate (CONCERTA) 27  MG ER tablet, , Disp: , Rfl:     propranolol (INDERAL) 20 mg tablet, , Disp: , Rfl:     Tri-Estarylla 0.18/0.215/0.25 MG-35 MCG per tablet, , Disp: , Rfl:   Food Intake and Lifestyle Assessment   Food Intake Assessment completed via usual diet recall  Breakfast: Usually skips 7:00am -12:00p  Drinks lots of water   Lunch: Hot bar at work or chix tenders from [employer]  Dinner: Mom cooks - keto meal - usually just protein and veg  Snack: when bored - candy or chips  Beverage intake: water,  Cirkul - sugar free beverages, and diet soda - occ  Portions:  9 oz Protein  0 c Starch   1.5  c Vegetable    Protein supplement: Used to drink 2023 when going to gym  (Ensure Max and Protein Popcorn)  Estimated protein intake per day:  grams  Estimated fluid intake per day: At least 100 oz   Meals eaten away from home: Most days for lunch - rarely out now as saving money but was 1-2X week   Typical meal pattern: 2 meals per day and 1-2 snacks per day  Eating Behaviors: Consumption of high calorie/ high fat foods, Large portion sizes, Mindless eating, Emotional eating, Craves sweet foods, and Craves salty foods (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 - NCC   Has kittens, guinea pig, horse chicken- 2 mixed dogs     Nutrition Diagnosis  Diagnosis: Overweight / Obesity (NC-3.3)  Related to: Physical inactivity and Excessive energy intake  As Evidenced by: BMI >25     Nutrition Prescription: Recommend the following diet  Regular    Interventions and  "Teaching   Discussed pre-op and post-op nutrition guidelines.       Patient educated and handouts provided.  Surgical changes to stomach / GI  Capacity of post-surgery stomach  Diet progression  Adequate hydration  Sugar and fat restriction to decrease \"dumping syndrome\"  Fat restriction to decrease steatorrhea  Expected weight loss  Weight loss plateaus/ possibility of weight regain  Exercise  Suggestions for pre-op diet  Nutrition considerations after surgery  Protein supplements  Meal planning and preparation  Appropriate carbohydrate, protein, and fat intake, and food/fluid choices to maximize safe weight loss, nutrient intake, and tolerance   Dietary and lifestyle changes  Possible problems with poor eating habits  Intuitive eating  Techniques for self monitoring and keeping daily food journal  Potential for food intolerance after surgery, and ways to deal with them including: lactose intolerance, nausea, reflux, vomiting, diarrhea, food intolerance, appetite changes, gas  Vitamin / Mineral supplementation of Multivitamin with minerals, Calcium, Vitamin B12, Iron, Fat Soluble vitamins, and Vitamin D    Patient is not currently pregnant and doesn't desire to become pregnant a minimum of one year post-op    Education provided to: patient    Barriers to learning: No barriers identified    Readiness to change: preparation    Prior research on procedure: discussed with provider, internet - Mom also had surgery    Comprehension: verbalizes understanding     Expected Compliance: good  Recommendations  Pt is an appropriate candidate for surgery. Yes    Evaluation / Monitoring  Dietitian to Monitor: Eating pattern as discussed Tolerance of nutrition prescription Body weight Lab values Physical activity Bowel pattern    2/6 Weight Check Visit Summary 9/27/2024  Started pre op process. Reports to be more stressed  lately therefore emotionally eating. 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 Had " "discussion and support provided. Pt has been reviewing manual and guidelines  Struggles with eating breakfast, lunch varies and reports best meal is dinner that mom makes.  Suggested protein drinks for breakfast or possibly to use when starts to \"mindlessly eat\" d/t stress. Reviewed different brands and samples provided   Walking for exercise and plans to join gym - Advised as positive coping skill. Questions answered pt receptive    3/6 Weight Check Visit Summary 9/27/2024  Pt doing better - more motivated. Going to gym regularly. Lost job so now less stressed. Doing online courses at [employer]  Made changes to diet : stopped soda and drinking a lot of water -( 40 oz cup - 4-5 refills) Eating slower but struggling with 30/60 - Has been consistent with eating 3 meals and smaller portions. Tried Protein Shake and liked \"Rockin Protein Builder\"  Had questions re: surgery pain and medications including her mental health medications. Advised to discuss with prescribing physician. Also informed that topics will be discussed at preop class. Pt has weight goal of 200# post op - Discussed how she would maintain her weight per questions asked to maintain     5/6 Weight Check Visit Summary 12/17/2024  Continues to prepare for bariatric surgery. Going to gym and staying motivated. Has been running errands more lately and finds difficult eating away from home so mostly selects subway - Does well eating healthy at home.  Uses protein shakes as meal replacement in am . Challenged by 30/60 but has been sipping fluids and eating slower. Questions/concerns addressed. Pt receptive.     Monthly PreOp Visit Summary 4/10//2025  Seen MWM - Isabel Ding. Started Zepbound. Had gained during process and at highest was 369#. Has lost 40# since starting medication and incorporating diet and lifestyle changes. May consider halting surgical process if continues to lose. Had started  new job at [employer] - very active - walking and lifting. Also " broke up with partner who was not supportive. Feels more confident and happy. Pt grateful to have gone through process and finds education and support invaluable.   Workflow reviewed:   Psych and/or D+A Clearance: recommended outpatient therapy 12/19/24  PCP Letter: Discussed at recent visit on 8/9/24  Support Group: No longer required, strongly encouraged  Surgeon Appt: 8/6/24  EGD: Completed 11/22/24  Cardiac Risk Assessment: 12/16/24 - stress test -1/6/24  Sleep Studies: N/A (SB 4/8)  Blood work: Completed TSH on 1/13/25 andalso  CBC and CMP  -   Nicotine test: Needs- vaping non-nicotine, but will still require testing- [id number]/30/24  Weight loss meds: N/A  Required weight checks: 6 months required - completed  Weight Loss: Not required, encouraged positive lifestyle changes.    Goals  Food journal, Exercise 30 minutes 5 times per week, Complete lession plans 1-6, Eat 3 meals per day, and Eliminate mindless snacking  Follow Pre-Surgery guidelines  > Trial Baritastic for food logging  - Used MyFitNesPal  Carb Magr, WW   > Establish regular meal pattern - include fruits, vegetables and whole grains  > Avoid skipping meals- Can use protein drink as meal replacement  > Decrease portions  > Focus on protein - include lean protein at each meal and snack - Learn to eat protein first  > Limit processed foods, fast foods and dining away from home  > Continue to Limit snacks - healthier choices and portion; avoid grazing  > Slow pace of eating and sip fluids  - practice 30/60 minute rule  > Continue to limit caffeine and carbonation -  eliminate by day of surgery  >> Maintain water intake >=64 oz  > Continue to increase physical activity/establish exercise regimen   > Start multi vitamin and additional Vitamin D 2000IU (needs level checked )  Work on skills to cope with emotional eating/mindfull eating  Pre-op weight loss not required but advised not to gain weight  Bloodwork - had completed 8/14/2024 except TSH -    Other: Vapes non nicotine products  F/U next month with bariatric provider      Time Spent:   30 Minutes

## 2025-04-11 DIAGNOSIS — E28.2 PCOS (POLYCYSTIC OVARIAN SYNDROME): ICD-10-CM

## 2025-04-11 DIAGNOSIS — E66.01 MORBID (SEVERE) OBESITY DUE TO EXCESS CALORIES (HCC): Primary | ICD-10-CM

## 2025-04-11 RX ORDER — TIRZEPATIDE 5 MG/.5ML
5 INJECTION, SOLUTION SUBCUTANEOUS WEEKLY
Qty: 2 ML | Refills: 1 | Status: SHIPPED | OUTPATIENT
Start: 2025-04-11 | End: 2025-04-17 | Stop reason: ALTCHOICE

## 2025-04-17 ENCOUNTER — TELEPHONE (OUTPATIENT)
Age: 20
End: 2025-04-17

## 2025-04-17 DIAGNOSIS — E66.813 CLASS 3 SEVERE OBESITY DUE TO EXCESS CALORIES WITHOUT SERIOUS COMORBIDITY WITH BODY MASS INDEX (BMI) OF 50.0 TO 59.9 IN ADULT: Primary | ICD-10-CM

## 2025-04-17 DIAGNOSIS — R11.0 NAUSEA: ICD-10-CM

## 2025-04-17 RX ORDER — TIRZEPATIDE 2.5 MG/.5ML
2.5 INJECTION, SOLUTION SUBCUTANEOUS WEEKLY
Qty: 2 ML | Refills: 0 | Status: SHIPPED | OUTPATIENT
Start: 2025-04-17 | End: 2025-05-15

## 2025-04-17 RX ORDER — ONDANSETRON 4 MG/1
TABLET, ORALLY DISINTEGRATING ORAL
Qty: 15 TABLET | Refills: 0 | Status: SHIPPED | OUTPATIENT
Start: 2025-04-17

## 2025-04-17 NOTE — TELEPHONE ENCOUNTER
Please let patient know--  Stop Zepbound for now  I will send Rx for Zepbound 2.5mg dose but would not resume until symptoms fully resolve  If unsure, call office for guidance on 4/22.   Keep well hydrated  I will send RX for Zofran  Please go to ER if symptoms worsening and do not resolve if unable to keep hydrated.

## 2025-04-17 NOTE — TELEPHONE ENCOUNTER
Received call from patients mother.    States the patient started Zepbound 5mg on Tuesday and has been vomiting since. States she vomited x 5 yesterday.     She stated that the patient was doing very well on Zepbound 2.5mg and wonders if she can remain at the lower dose for additional time. I did explain that sometimes insurance will not authorize longer than 1 month for starter dose.    Please advise if Zepbound 2.5mg can be ordered for patient. Next injection due 4/22    #[phone]

## 2025-04-25 ENCOUNTER — TELEPHONE (OUTPATIENT)
Dept: BARIATRICS | Facility: CLINIC | Age: 20
End: 2025-04-25

## 2025-04-25 NOTE — TELEPHONE ENCOUNTER
FOLLOW UP: 5*29/25    REASON FOR CONVERSATION: Medication Prior Authorization    SYMPTOMS: N/A    OTHER: Patient went to pharmacy to  Zepbound. Told she needs a Prior Authorization    DISPOSITION: Order Prescription  Reason for call:   [x] Prior Auth  [] Other:     Caller:  [x] Patient  [] Pharmacy  Name:   Address:   Callback Number:     Medication: Zepbound    Dose/Frequency: 2.5 mg weekly    Quantity: 2 ml    Ordering Provider:   [] PCP/Provider -   [x] Speciality/Provider -   Miladys Espinal PA-C    Has the patient tried other medications and failed? If failed, which medications did they fail?    [] No   [x] Yes - Zepbound 5 mg    Is the patient's insurance updated in EPIC?   [] Yes   [] No     Is a copy of the patient's insurance scanned in EPIC?   [] Yes   [] No

## 2025-04-30 NOTE — TELEPHONE ENCOUNTER
PT called in checking on status of PA for Zepbound 2.5mg. Advised that PA has not been initiated yet. PT updated that PA's are completed in the order of which they are received. Advised it may take 7-21 business days for PA to be initiated by PA Team. PT verbalized understanding.

## 2025-05-20 DIAGNOSIS — E28.2 PCOS (POLYCYSTIC OVARIAN SYNDROME): ICD-10-CM

## 2025-05-20 DIAGNOSIS — E66.01 MORBID (SEVERE) OBESITY DUE TO EXCESS CALORIES (HCC): ICD-10-CM

## 2025-05-20 DIAGNOSIS — E66.813 CLASS 3 SEVERE OBESITY DUE TO EXCESS CALORIES WITHOUT SERIOUS COMORBIDITY WITH BODY MASS INDEX (BMI) OF 50.0 TO 59.9 IN ADULT: Primary | ICD-10-CM

## 2025-05-20 RX ORDER — TIRZEPATIDE 5 MG/.5ML
5 INJECTION, SOLUTION SUBCUTANEOUS WEEKLY
Qty: 2 ML | Refills: 1 | Status: SHIPPED | OUTPATIENT
Start: 2025-05-20 | End: 2025-07-15

## 2025-05-27 ENCOUNTER — TELEPHONE (OUTPATIENT)
Age: 20
End: 2025-05-27

## 2025-05-27 NOTE — TELEPHONE ENCOUNTER
Patient has been added to the [facility] wait list without a referral.    Insurance: Sal  Insurance Type:    Commercial [x]   Medicaid []   [facility] (if applicable)[place] [facility] []  Location Preference: [place] office  Provider Preference: Female  Virtual: Yes [x] No [] But prefers in person  Were outside resources sent: Yes [x] No []    PC: Was looking to become a part of the [facility] program back when they were in school and looking for services now

## 2025-05-29 ENCOUNTER — OFFICE VISIT (OUTPATIENT)
Dept: BARIATRICS | Facility: CLINIC | Age: 20
End: 2025-05-29
Payer: COMMERCIAL

## 2025-05-29 VITALS
RESPIRATION RATE: 14 BRPM | OXYGEN SATURATION: 97 % | WEIGHT: 293 LBS | SYSTOLIC BLOOD PRESSURE: 118 MMHG | BODY MASS INDEX: 44.41 KG/M2 | HEIGHT: 68 IN | DIASTOLIC BLOOD PRESSURE: 80 MMHG | HEART RATE: 78 BPM

## 2025-05-29 DIAGNOSIS — E78.2 MIXED HYPERLIPIDEMIA: ICD-10-CM

## 2025-05-29 DIAGNOSIS — E66.813 CLASS 3 SEVERE OBESITY DUE TO EXCESS CALORIES WITHOUT SERIOUS COMORBIDITY WITH BODY MASS INDEX (BMI) OF 50.0 TO 59.9 IN ADULT: Primary | ICD-10-CM

## 2025-05-29 DIAGNOSIS — E66.01 MORBID (SEVERE) OBESITY DUE TO EXCESS CALORIES (HCC): ICD-10-CM

## 2025-05-29 DIAGNOSIS — E28.2 PCOS (POLYCYSTIC OVARIAN SYNDROME): ICD-10-CM

## 2025-05-29 PROCEDURE — 99214 OFFICE O/P EST MOD 30 MIN: CPT

## 2025-05-29 RX ORDER — TIRZEPATIDE 5 MG/.5ML
5 INJECTION, SOLUTION SUBCUTANEOUS WEEKLY
Qty: 2 ML | Refills: 2 | Status: SHIPPED | OUTPATIENT
Start: 2025-05-29 | End: 2025-08-21

## 2025-05-29 NOTE — PROGRESS NOTES
Assessment/Plan:     Class 3 severe obesity due to excess calories without serious comorbidity with body mass index (BMI) of 50.0 to 59.9 in adult (HCC)  - Patient is pursuing Conservative Program and follow up visits with medical weight management provider  - Initial weight loss goal of 5-10% weight loss for improved health. Weight loss can improve patient's co-morbid conditions and/or prevent weight-related complications.  - Explained the importance of continuing lifestyle changes in addition to any anti-obesity medications.   - Labs reviewed from 1/2025    General Recommendations:  Nutrition:  Eat breakfast daily.  Do not skip meals.      Food log (ie.) [email or web address] [email or web address], sparkpeople.com, loseit.com, Fanli website.com, etc.     Practice mindful eating.  Be sure to set aside time to eat, eat slowly, and savor your food.     Hydration:    At least 64oz of water daily.  No sugar sweetened beverages.  No juice (eat the fruit instead).     Exercise:  Studies have shown that the ideal exercise goal is somewhere between 150 to 300 minutes of moderate intensity exercise a week.  Start with exercising 10 minutes every other day and gradually increase physical activity with a goal of at least 150 minutes of moderate intensity exercise a week, divided over at least 3 days a week.  An example of this would be exercising 30 minutes a day, 5 days a week.  Resistance training can increase muscle mass and increase our resting metabolic rate.   FULL BODY resistance training is recommended 2-3 times a week.  Do not do this on consecutive days to allow for muscle recovery.     Aim for a bare minimum 5000 steps, even on days you do not exercise.     Monitoring:   Weigh yourself daily.  If this causes undue stress, then just weigh yourself once a week.  Weigh yourself the same time of the day with the same amount of clothing on.  Preferably this should be done after waking up, before you eat, and with no clothing or minimal clothing  on.     Specific Goals:  Dicussed her nutrition and she is encouraged to continue to track her food. Increase her protein intake and her fiber intake. Discussed protein rich snacks that she can incorporate. Discussed increasing her physical activity and introducing weight and strength training as able.   Discussed her zepbound 5mg and she is doing great on this dose and will keep her here for now.   Patient denies personal history of pancreatitis. Patient also denies personal and family history of medullary thyroid cancer and multiple endocrine neoplasia type 2 (MEN 2 tumor). Patient denies any history of kidney stones, seizures, or glaucoma.          Astrid was seen today for follow-up.    Diagnoses and all orders for this visit:    Class 3 severe obesity due to excess calories without serious comorbidity with body mass index (BMI) of 50.0 to 59.9 in adult    Mixed hyperlipidemia    Morbid (severe) obesity due to excess calories (HCC)  -     tirzepatide (Zepbound) 5 mg/0.5 mL auto-injector; Inject 0.5 mL (5 mg total) under the skin once a week    PCOS (polycystic ovarian syndrome)  -     tirzepatide (Zepbound) 5 mg/0.5 mL auto-injector; Inject 0.5 mL (5 mg total) under the skin once a week        Total time spent reviewing chart, interviewing patient, examining patient, discussing plan, answering all questions, and documentin minutes with >50% face-to-face time with the patient.    Follow up in approximately 6 months with Non-Surgical Physician/Advanced Practitioner.    Subjective:   Chief Complaint   Patient presents with    Follow-up       Patient ID: Astrid Sanabria  is a 20 y.o. female with excess weight/obesity here to pursue weight management.  Patient is pursuing Conservative Program.   Most recent notes and records were reviewed.    HPI    Wt Readings from Last 20 Encounters:   25 (!) 142 kg (312 lb)   04/10/25 (!) 149 kg (329 lb) (>99%, Z= 3.01)*   25 (!) 159 kg (350 lb) (>99%, Z= 3.08)*    02/14/25 (!) 167 kg (369 lb 3.2 oz) (>99%, Z= 3.14)*   01/17/25 (!) 163 kg (360 lb) (>99%, Z= 3.10)*   01/13/25 (!) 163 kg (360 lb 3.7 oz) (>99%, Z= 3.10)*   12/17/24 (!) 158 kg (349 lb 3.2 oz) (>99%, Z= 3.05)*   12/16/24 (!) 157 kg (346 lb) (>99%, Z= 3.04)*   11/27/24 (!) 156 kg (343 lb) (>99%, Z= 3.02)*   11/22/24 (!) 156 kg (343 lb 14.7 oz) (>99%, Z= 3.02)*   11/12/24 (!) 153 kg (337 lb) (>99%, Z= 3.00)*   10/30/24 (!) 153 kg (337 lb) (>99%, Z= 2.99)*   09/30/24 (!) 154 kg (340 lb) (>99%, Z= 2.99)*   08/27/24 (!) 153 kg (337 lb) (>99%, Z= 2.97)*   08/06/24 (!) 151 kg (333 lb 9.6 oz) (>99%, Z= 2.95)*   06/10/24 (!) 149 kg (328 lb) (>99%, Z= 2.91)*   05/01/24 (!) 149 kg (328 lb 3.2 oz) (>99%, Z= 2.89)*   04/11/24 (!) 147 kg (323 lb) (>99%, Z= 2.87)*   03/20/24 (!) 146 kg (322 lb 9.6 oz) (>99%, Z= 2.86)*   02/10/24 (!) 143 kg (315 lb 7.7 oz) (>99%, Z= 2.82)*     * Growth percentiles are based on CDC (Girls, 2-20 Years) data.       Patient presents today to medical weight management office for follow up. She is on the surgery track and is re consulting with [facility] for additional help with weight loss as she prepares for surgery she is also considering not going through with the surgery since she has done well to lose weight on her own so far.   She has a new job in a [employer] and has been implementing cynthias recommendations and she is losing weight because she is sweating at work regularly.      ADHD - managed on Concerta  Depression 0 managed on wellbutrin 150mg daily  DMDD - managed on abilify     Lifetime issue with weight.  In a new relationship and has started gaining weight since. Gained approximately 30lbs in 6 months     Previously saw Dr. Krueger. Was started on Phentermine and Topamax. Lost some weight. Discontinued medication and then regained. When she restarted the phentermine she had increased anxiety and anger with the phentermine. She finished it for about 1 month ago and emotions have since  regulated.      Previously tried weight watchers and other diet plans. Fletcher that she did a decent job with weight loss. Had issues with constipation but was possibly from the zofran as well as the zepbound. She trialed herself off of the zofran and has been fine since.   She has been on zepbound and she is doing well with no side effects.     Weight loss medication and dose: zepbound 5 mg   Started weight and date: 322 lbs 3/2024/ 369 2/14/2025/LOV 350lbs 3/4/2025  Current weight: 312 lbs   Difference: -57 lbs     Starting BMI: 49  Current BMI: 47    Food Intake and Lifestyle Assessment   Food Intake Assessment completed via usual diet recall  Breakfast: Usually skips 7:00am -12:00p  Drinks lots of water   Lunch: Hot bar at work or chix tenders from [employer]  Dinner: Mom cooks - keto meal - usually just protein and veg  Snack: when bored - candy or chips  Beverage intake: water,  Cirkul - sugar free beverages, and diet soda - occ  Portions:  9 oz Protein  0 c Starch   1.5  c Vegetable    Protein supplement: Used to drink 2023 when going to gym  (Ensure Max and Protein Popcorn)  Estimated protein intake per day:  grams  Estimated fluid intake per day: At least 100 oz   Meals eaten away from home: Most days for lunch - rarely out now as saving money but was 1-2X week   Typical meal pattern: 2 meals per day and 1-2 snacks per day  Eating Behaviors: Consumption of high calorie/ high fat foods, Large portion sizes, Mindless eating, Emotional eating, Craves sweet foods, and Craves salty foods (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    Family History[1]     Review of Systems   Constitutional:  Negative for fatigue.   HENT:  Negative for sore throat.    Respiratory:  Negative for cough and shortness of breath.    Cardiovascular:  Negative for chest pain, palpitations and leg swelling.   Gastrointestinal:  Negative for abdominal pain, constipation, diarrhea, nausea and vomiting.   Genitourinary:  Negative for dysuria.   Musculoskeletal:  Negative for arthralgias and back pain.   Skin:  Negative for rash.   Neurological:  Negative for headaches.   Psychiatric/Behavioral:  Negative for dysphoric mood. The patient is not nervous/anxious.        Objective:  /80 (BP Location: Left arm, Patient Position: Sitting, Cuff Size: Large)   Pulse 78   Resp 14   Ht 5' 8\" (1.727 m)   Wt (!) 142 kg (312 lb)   SpO2 97%   BMI 47.44 kg/m²     Physical Exam  Vitals and nursing note reviewed.   Constitutional:       Appearance: Normal appearance. She is obese.   HENT:      Head: Normocephalic.   Pulmonary:      Effort: Pulmonary effort is normal.     Neurological:      Mental Status: She is oriented to person, place, and time.     Psychiatric:         Mood and Affect: Mood normal.         Behavior: Behavior normal.         Thought Content: Thought content normal.         Judgment: Judgment normal.            Labs   Most recent labs reviewed   Lab Results   Component Value Date    SODIUM 137 01/13/2025    K 3.9 01/13/2025     01/13/2025    CO2 25 01/13/2025    AGAP 8 01/13/2025    BUN 12 01/13/2025    CREATININE 0.64 01/13/2025    GLUC 81 01/13/2025    CALCIUM 9.0 01/13/2025    AST 15 01/13/2025    ALT 17 01/13/2025    ALKPHOS 82 01/13/2025    TP 7.2 01/13/2025    TBILI 0.26 01/13/2025    EGFR 129 01/13/2025     Lab Results   Component Value Date    HGBA1C 5.6 08/14/2024     Lab Results   Component Value Date    VLU1GXDSBRJX 4.153 01/13/2025    TSH 2.15 04/04/2022     No results found for: \"CHOLESTEROL\"  No results found " "for: \"HDL\"  No results found for: \"TRIG\"  No results found for: \"LDLCALC\"         [1]   Family History  Problem Relation Name Age of Onset    No Known Problems Mother      Hypertension Father      Hyperlipidemia Father      Hypertension Maternal Grandmother      Hyperlipidemia Maternal Grandmother      Multiple sclerosis Maternal Grandmother       "

## 2025-05-29 NOTE — ASSESSMENT & PLAN NOTE
- Patient is pursuing Conservative Program and follow up visits with medical weight management provider  - Initial weight loss goal of 5-10% weight loss for improved health. Weight loss can improve patient's co-morbid conditions and/or prevent weight-related complications.  - Explained the importance of continuing lifestyle changes in addition to any anti-obesity medications.   - Labs reviewed from 1/2025    General Recommendations:  Nutrition:  Eat breakfast daily.  Do not skip meals.      Food log (ie.) www.Jibbigo.com, sparkpeople.com, loseit.com, calorieking.com, etc.     Practice mindful eating.  Be sure to set aside time to eat, eat slowly, and savor your food.     Hydration:    At least 64oz of water daily.  No sugar sweetened beverages.  No juice (eat the fruit instead).     Exercise:  Studies have shown that the ideal exercise goal is somewhere between 150 to 300 minutes of moderate intensity exercise a week.  Start with exercising 10 minutes every other day and gradually increase physical activity with a goal of at least 150 minutes of moderate intensity exercise a week, divided over at least 3 days a week.  An example of this would be exercising 30 minutes a day, 5 days a week.  Resistance training can increase muscle mass and increase our resting metabolic rate.   FULL BODY resistance training is recommended 2-3 times a week.  Do not do this on consecutive days to allow for muscle recovery.     Aim for a bare minimum 5000 steps, even on days you do not exercise.     Monitoring:   Weigh yourself daily.  If this causes undue stress, then just weigh yourself once a week.  Weigh yourself the same time of the day with the same amount of clothing on.  Preferably this should be done after waking up, before you eat, and with no clothing or minimal clothing on.     Specific Goals:  Dicussed her nutrition and she is encouraged to continue to track her food. Increase her protein intake and her fiber intake.  Discussed protein rich snacks that she can incorporate. Discussed increasing her physical activity and introducing weight and strength training as able.   Discussed her zepbound 5mg and she is doing great on this dose and will keep her here for now.   Patient denies personal history of pancreatitis. Patient also denies personal and family history of medullary thyroid cancer and multiple endocrine neoplasia type 2 (MEN 2 tumor). Patient denies any history of kidney stones, seizures, or glaucoma.

## 2025-06-30 ENCOUNTER — VBI (OUTPATIENT)
Dept: ADMINISTRATIVE | Facility: OTHER | Age: 20
End: 2025-06-30

## 2025-06-30 NOTE — TELEPHONE ENCOUNTER
06/30/25 9:14 AM     Chart reviewed for Child [facility] Adoles[facility] Well-[facility] Visits, Chlamydia Screening in Women was/were not submitted to the patient's insurance.     Elizabet Clay MA   [facility]